# Patient Record
Sex: FEMALE | Race: WHITE | URBAN - METROPOLITAN AREA
[De-identification: names, ages, dates, MRNs, and addresses within clinical notes are randomized per-mention and may not be internally consistent; named-entity substitution may affect disease eponyms.]

---

## 2017-07-25 ENCOUNTER — HOSPITAL ENCOUNTER (OUTPATIENT)
Dept: SURGERY | Age: 57
Discharge: HOME | End: 2017-07-25
Attending: INTERNAL MEDICINE | Admitting: INTERNAL MEDICINE

## 2017-07-25 VITALS
SYSTOLIC BLOOD PRESSURE: 91 MMHG | DIASTOLIC BLOOD PRESSURE: 47 MMHG | WEIGHT: 178 LBS | HEART RATE: 75 BPM | OXYGEN SATURATION: 100 % | HEIGHT: 67 IN | BODY MASS INDEX: 27.94 KG/M2 | RESPIRATION RATE: 18 BRPM | TEMPERATURE: 99 F

## 2017-07-25 LAB — PREGNANCY, URINE: NEGATIVE

## 2017-07-25 RX ORDER — SODIUM CHLORIDE, SODIUM LACTATE, POTASSIUM CHLORIDE, CALCIUM CHLORIDE 600; 310; 30; 20 MG/100ML; MG/100ML; MG/100ML; MG/100ML
INJECTION, SOLUTION INTRAVENOUS CONTINUOUS
Status: DISCONTINUED | OUTPATIENT
Start: 2017-07-25 | End: 2017-07-26 | Stop reason: HOSPADM

## 2017-07-25 RX ORDER — LIDOCAINE HYDROCHLORIDE 10 MG/ML
0.1 INJECTION, SOLUTION EPIDURAL; INFILTRATION; INTRACAUDAL; PERINEURAL ONCE
Status: DISCONTINUED | OUTPATIENT
Start: 2017-07-25 | End: 2017-07-26 | Stop reason: HOSPADM

## 2017-07-25 RX ORDER — GARLIC 180 MG
TABLET, DELAYED RELEASE (ENTERIC COATED) ORAL
COMMUNITY

## 2017-07-25 RX ADMIN — SODIUM CHLORIDE, SODIUM LACTATE, POTASSIUM CHLORIDE, CALCIUM CHLORIDE: 600; 310; 30; 20 INJECTION, SOLUTION INTRAVENOUS at 09:50

## 2017-07-25 ASSESSMENT — PAIN - FUNCTIONAL ASSESSMENT: PAIN_FUNCTIONAL_ASSESSMENT: 0-10

## 2017-07-25 ASSESSMENT — PAIN SCALES - GENERAL: PAINLEVEL_OUTOF10: 0

## 2023-12-12 NOTE — PROGRESS NOTES
[facility]    CC: Breast CA     Referring physician: Johnathan Henley MD    HPI: This is a 66 y.o. female with a PMHx as delineated below who presents in consultation for breast reconstruction. She was found to have left breast cancer and desires to proceed with breast conservation therapy with lumpectomy and possible oncoplastic reconstruction vs mastectomy. She has a history of long standing breast implants as well (2000). Given these features, plastic surgery was consulted for evaluation and treatment. Of note, she had a sleeve gastrectomy in 2014.  The specifics of her breast cancer workup / treatment is as follows:     Diagnosis: DCIS  Mo/Yr Diagnosed: 12/23  Breast Involved:Left  Tumor Size & ndGndrndanddndend:nd nd2nd Stage: 0  Myrtle status: Negative  ER: Positive ND: Positive HER2: Unknown    Oncologist: None  Radiation: WILL NEED IF BCT    Chemo/Meds: None  Pregnancy/Miscarriages: 2 / 0    PMHx:   Past Medical History:   Diagnosis Date    Knapp's esophagus 2002    dx not supported on subsequent exams    Cancer ([address])     basal cell skin    Diverticulosis of colon (without mention of hemorrhage) 2006    DM (diabetes mellitus), gestational 200    Dyslipidemia 2009    Data in [facility] records, patient denies Dx    Hypothyroidism     Migraine     Reflux esophagitis 2004    S/P MADINA (total abdominal hysterectomy)     denies this diagnosis  jluy 2017    Thyroid disease     hypothyroidism     PSHx:   Past Surgical History:   Procedure Laterality Date    ABDOMINAL SURGERY  2014    sleeve     BLADDER SUSPENSION      BREAST SURGERY  2000    augmentation    COLONOSCOPY  07/11/2006    Diverticulosis    COLONOSCOPY  02/19/2013    diverticulosis    CYSTOSCOPY      KIDNEY REMOVAL Right 2015    cancer    SINUS SURGERY      UPPER GASTROINTESTINAL ENDOSCOPY  05/18/2002    Knapp's    UPPER GASTROINTESTINAL ENDOSCOPY  02/19/2013    gastric polyps    UPPER GASTROINTESTINAL ENDOSCOPY  03/29/2011    Gastric polyps

## 2023-12-13 ENCOUNTER — OFFICE VISIT (OUTPATIENT)
Dept: SURGERY | Age: 63
End: 2023-12-13
Payer: COMMERCIAL

## 2023-12-13 VITALS
DIASTOLIC BLOOD PRESSURE: 91 MMHG | WEIGHT: 180 LBS | TEMPERATURE: 97.3 F | HEART RATE: 83 BPM | OXYGEN SATURATION: 100 % | BODY MASS INDEX: 28.19 KG/M2 | SYSTOLIC BLOOD PRESSURE: 136 MMHG | RESPIRATION RATE: 18 BRPM

## 2023-12-13 DIAGNOSIS — C50.912 MALIGNANT NEOPLASM OF LEFT BREAST IN FEMALE, ESTROGEN RECEPTOR POSITIVE, UNSPECIFIED SITE OF BREAST (HCC): Primary | ICD-10-CM

## 2023-12-13 DIAGNOSIS — Z17.0 MALIGNANT NEOPLASM OF LEFT BREAST IN FEMALE, ESTROGEN RECEPTOR POSITIVE, UNSPECIFIED SITE OF BREAST (HCC): Primary | ICD-10-CM

## 2023-12-13 PROCEDURE — 1036F TOBACCO NON-USER: CPT | Performed by: SURGERY

## 2023-12-13 PROCEDURE — G8427 DOCREV CUR MEDS BY ELIG CLIN: HCPCS | Performed by: SURGERY

## 2023-12-13 PROCEDURE — 3017F COLORECTAL CA SCREEN DOC REV: CPT | Performed by: SURGERY

## 2023-12-13 PROCEDURE — G8419 CALC BMI OUT NRM PARAM NOF/U: HCPCS | Performed by: SURGERY

## 2023-12-13 PROCEDURE — 99205 OFFICE O/P NEW HI 60 MIN: CPT | Performed by: SURGERY

## 2023-12-13 PROCEDURE — G8484 FLU IMMUNIZE NO ADMIN: HCPCS | Performed by: SURGERY

## 2023-12-13 RX ORDER — ATORVASTATIN CALCIUM 20 MG/1
20 TABLET, FILM COATED ORAL DAILY
COMMUNITY

## 2023-12-13 RX ORDER — OMEPRAZOLE 20 MG/1
20 CAPSULE, DELAYED RELEASE ORAL DAILY
COMMUNITY
Start: 2023-12-08

## 2023-12-13 RX ORDER — SEMAGLUTIDE 0.68 MG/ML
INJECTION, SOLUTION SUBCUTANEOUS
COMMUNITY
Start: 2023-11-25

## 2023-12-13 RX ORDER — AMLODIPINE BESYLATE AND BENAZEPRIL HYDROCHLORIDE 5; 10 MG/1; MG/1
1 CAPSULE ORAL DAILY
COMMUNITY
Start: 2023-12-02

## 2023-12-14 ENCOUNTER — TELEPHONE (OUTPATIENT)
Dept: SURGERY | Age: 63
End: 2023-12-14

## 2023-12-14 NOTE — TELEPHONE ENCOUNTER
The patient was in the office to see Dr. Saad Campos yesterday. PLAN: Discussed options with Jorge Driscoll. She is leaning toward explantation of her implants as well as concomitant lumpectomy with oncoplastic reconstruction. We discussed the risks involved with replacement of her implants given the need for radiation and she understands and desires to have implants placed. Will work with Dr. Oscar Soto and schedule. I received a surgery letter. According to the [facility] Provider Portal : Notification or Prior Authorization is not required for the requested services    This [employer] plan does not currently require a prior authorization for these services. If you have general questions about the prior authorization requirements, please call us at [phone] or visit [employer] > Clinician Resources > Advance and Admission Notification Requirements. The number above acknowledges your notification. Please write this number down for future reference. Notification is not a guarantee of coverage or payment. Decision ID #:[id number]     I lmom for the patient at the home number listed. I requested a call back to discuss insurance and surgery scheduling. Time HELD 1- 11 am.    I will leave this phone note open.

## 2023-12-15 NOTE — TELEPHONE ENCOUNTER
I spoke with the patient at the home number listed. The patient is now scheduled for surgery with  on 1-. The patient is aware of H&P. The patient is scheduled for her post op appointment 1-. I will submit the surgery letter to [facility] today. I will mail the surgery information and instructions to the patient today. I will close this phone note.

## 2023-12-15 NOTE — TELEPHONE ENCOUNTER
I lmom for the patient at the home number listed. I requested a call back to discuss insurance and surgery scheduling. Time HELD 1- 11 am.     I will leave this phone note open.

## 2023-12-28 ENCOUNTER — TELEPHONE (OUTPATIENT)
Dept: SURGERY | Age: 63
End: 2023-12-28

## 2023-12-28 NOTE — TELEPHONE ENCOUNTER
Patient wanting to know if Dr. Homero Perez will be doing nipple reconstruction during her surgery, or if that is something he even does    Please follow up with patient [phone]

## 2023-12-28 NOTE — TELEPHONE ENCOUNTER
Spoke with patient, she said she spoke with Dr. Kandice Rao about possibly doing L sided mastectomy vs oncoplastic reduction. She states if she goes the mastectomy route Dr. Jose David Yan said it would be non-nipple sparing. She wanted to know if Dr. Jere Gillespie would do nipple recon same day of surgery or if it would be delayed.

## 2023-12-28 NOTE — TELEPHONE ENCOUNTER
[facility]    Would not recommend nipple reconstruction at the same time as the nipple reconstruction is based on the blood supply of the mastectomy skin, which is the most compromised at the time of our mastectomy. Thanks!   NK

## 2023-12-29 NOTE — TELEPHONE ENCOUNTER
Spoke with patient. She verbalized understanding and states she will be making a decision on which surgical route she wants to go and will call us back and give us an update.

## 2024-06-10 ENCOUNTER — HOSPITAL ENCOUNTER (OUTPATIENT)
Dept: WOMENS IMAGING | Age: 64
Discharge: HOME | End: 2024-06-10
Payer: COMMERCIAL

## 2024-06-10 DIAGNOSIS — Z79.811 USE OF AROMATASE INHIBITORS: ICD-10-CM

## 2024-06-10 DIAGNOSIS — C50.112 MALIGNANT NEOPLASM OF CENTRAL PORTION OF LEFT FEMALE BREAST, UNSPECIFIED ESTROGEN RECEPTOR STATUS (HCC): ICD-10-CM

## 2024-06-10 PROCEDURE — 77080 DXA BONE DENSITY AXIAL: CPT

## 2024-11-04 ENCOUNTER — OFFICE VISIT (OUTPATIENT)
Dept: UROGYNECOLOGY | Age: 64
End: 2024-11-04

## 2024-11-04 VITALS
TEMPERATURE: 98.2 F | DIASTOLIC BLOOD PRESSURE: 81 MMHG | SYSTOLIC BLOOD PRESSURE: 113 MMHG | RESPIRATION RATE: 16 BRPM | WEIGHT: 153.8 LBS | OXYGEN SATURATION: 100 % | BODY MASS INDEX: 24.09 KG/M2 | HEART RATE: 64 BPM

## 2024-11-04 DIAGNOSIS — N39.41 URGE INCONTINENCE: ICD-10-CM

## 2024-11-04 DIAGNOSIS — R39.15 URINARY URGENCY: Primary | ICD-10-CM

## 2024-11-04 DIAGNOSIS — Z98.890 HISTORY OF SUBURETHRAL SLING PROCEDURE: ICD-10-CM

## 2024-11-04 LAB
BILIRUBIN, POC: NORMAL
BLOOD URINE, POC: NORMAL
CLARITY, POC: CLEAR
COLOR, POC: YELLOW
EMPTY COUGH STRESS TEST: NORMAL
FIRST SENSATION: 130 CC
FULL COUGH STRESS TEST: NORMAL
GLUCOSE URINE, POC: NORMAL MG/DL
KETONES, POC: NORMAL MG/DL
LEUKOCYTE EST, POC: NORMAL
MAX SENSATION: 250 CC
NITRATE, URINE POC: NORMAL
NITRITE, POC: NORMAL
PH, POC: 6.5
POST VOID RESIDUAL (PVR): 5 ML
PROTEIN, POC: NORMAL MG/DL
RBC URINE, POC: NORMAL
SECOND SENSATION: 190 CC
SPASM: NORMAL
SPECIFIC GRAVITY, POC: 1.01
UROBILINOGEN, POC: NORMAL MG/DL
WBC URINE, POC: NORMAL

## 2024-11-04 PROCEDURE — 99204 OFFICE O/P NEW MOD 45 MIN: CPT | Performed by: NURSE PRACTITIONER

## 2024-11-04 PROCEDURE — 81002 URINALYSIS NONAUTO W/O SCOPE: CPT | Performed by: NURSE PRACTITIONER

## 2024-11-04 PROCEDURE — 51725 SIMPLE CYSTOMETROGRAM: CPT | Performed by: NURSE PRACTITIONER

## 2024-11-04 RX ORDER — ANASTROZOLE 1 MG/1
1 TABLET ORAL DAILY
COMMUNITY

## 2024-11-04 ASSESSMENT — ENCOUNTER SYMPTOMS
VOICE CHANGE: 1
ABDOMINAL PAIN: 1
CONSTIPATION: 1
ABDOMINAL DISTENTION: 1

## 2024-11-04 NOTE — PROGRESS NOTES
11/4/2024      HPI:     Name: Petra Hidalgo  YOB: 1960    CC: Patient is a 64 y.o. female who is here for evaluation of  \"bladder leakage with running water\" . Patient has a history of breast cancer.     HPI:  Previous patient of Dr. Caruso many years ago for [facility]  Now with c/o urinary urgency and leakage specifically with Running water    Drinks 4 bottles water on good days, protein shake, No caffeine or carbonation.    PMH:  Gastric sleeve, breast augmenation, R Nephrectomy d/t kidney CA, Breast Cancer ER+ and lumpectomy 2024.     DATE OF OPERATION: 12/27/2011    PREOPERATIVE DIAGNOSIS: Symptomatic stress urinary incontinence.    POSTOPERATIVE DIAGNOSIS: Symptomatic stress urinary incontinence.    PROCEDURES:  1. Transobturator midurethral sling.  2. Cystourethroscopy.    SURGEON: Leonardo Caruso M.D.    ASSISTANTS: Soha Carl M.D. and Humble Mendoza M.D.    ANESTHESIA: MAC with local.    ESTIMATED BLOOD LOSS: Less than 50 mL.    COMPLICATIONS: None.   Bladder control problem: Yes   How many months have you had a bladder problem? 1 years  Do you use pads to absorb lost urine? No  How many trips do you make to the bathroom during the day? 2  How many times do you wake at night to go to the bathroom? 1  Do you ever wet the bed while asleep? No  Are there times when you cannot make it to the bathroom on time? Yes  Does sound, sight, or feel of running water cause you to lose urine? Yes  How many ounces of liquid do you consume daily? 48 oz  How many drinks containing caffeine do you consume daily?   Which best describes urine loss: (Check all that apply)  [] I lose urine during coughing, sneezing, running, lifting  [] I lose urine with changes in posture, standing, walking  [] I lose urine continuously such that I am constantly wet  [x] I have sudden, urgent needs without the ability to make it to the bathroom  Have you seen a physician for complaints of urine loss? Yes, has seen Dr. Caruso in

## 2024-11-11 ENCOUNTER — TELEPHONE (OUTPATIENT)
Dept: UROGYNECOLOGY | Age: 64
End: 2024-11-11

## 2024-11-12 NOTE — TELEPHONE ENCOUNTER
Called patient- advised her to find a PFPT close to where she is from and give us a call back and we can place the order. Patient verbalized understanding, denies any further questions at this time.

## 2024-11-12 NOTE — TELEPHONE ENCOUNTER
Patient returned call - requesting referral to [facility] on [address] for PFPT. Advised patient that office is now closed, but we will place referral order first thing tomorrow morning. Patient thankful, denies any further needs at this time.

## 2024-11-13 DIAGNOSIS — R39.15 URINARY URGENCY: Primary | ICD-10-CM

## 2024-11-13 DIAGNOSIS — N39.41 URGE INCONTINENCE: ICD-10-CM

## 2024-11-13 NOTE — TELEPHONE ENCOUNTER
Called patient and left voicemail stating referral to [facility] has been placed and faxed successfully at this time per her request. Provided phone number to schedule. Encouraged patient to call back should any questions arise.

## 2024-11-27 ENCOUNTER — OFFICE VISIT (OUTPATIENT)
Age: 64
End: 2024-11-27

## 2024-11-27 VITALS
SYSTOLIC BLOOD PRESSURE: 108 MMHG | OXYGEN SATURATION: 96 % | DIASTOLIC BLOOD PRESSURE: 68 MMHG | BODY MASS INDEX: 24.01 KG/M2 | HEART RATE: 95 BPM | TEMPERATURE: 97.7 F | HEIGHT: 67 IN | WEIGHT: 153 LBS

## 2024-11-27 DIAGNOSIS — R30.0 DYSURIA: ICD-10-CM

## 2024-11-27 DIAGNOSIS — N30.00 ACUTE CYSTITIS WITHOUT HEMATURIA: Primary | ICD-10-CM

## 2024-11-27 DIAGNOSIS — R09.81 NASAL CONGESTION: ICD-10-CM

## 2024-11-27 LAB
BILIRUBIN, POC: ABNORMAL
BLOOD URINE, POC: ABNORMAL
CLARITY, POC: CLEAR
COLOR, POC: YELLOW
GLUCOSE URINE, POC: ABNORMAL MG/DL
KETONES, POC: 80 MG/DL
LEUKOCYTE EST, POC: ABNORMAL
Lab: NORMAL
NITRITE, POC: POSITIVE
PERFORMING INSTRUMENT: NORMAL
PH, POC: 5.5
PROTEIN, POC: 100 MG/DL
QC PASS/FAIL: NORMAL
SARS-COV-2, POC: NORMAL
SPECIFIC GRAVITY, POC: 1.02
UROBILINOGEN, POC: 0.2 MG/DL

## 2024-11-27 RX ORDER — CEPHALEXIN 500 MG/1
500 CAPSULE ORAL 2 TIMES DAILY
Qty: 10 CAPSULE | Refills: 0 | Status: SHIPPED | OUTPATIENT
Start: 2024-11-27 | End: 2024-12-02

## 2024-11-27 NOTE — PATIENT INSTRUCTIONS
Urinalysis was positive for UTI.   Take antibiotic as prescribed.     Upper Respiratory Symptoms:  COVID was negative.     You may take over-the-counter antihistamine such as allegra, zyrtec, claritin.     Alternate Tylenol and Motrin every 4 hours as directed as needed for chills, fever.     Follow-up with your primary care physician or return if not improved.     Go to the ER if you develop significant shortness of breath, difficulty breathing, high fever, chest pain or other major concerning symptoms.

## 2024-11-27 NOTE — PROGRESS NOTES
Petra Hidalgo (:  1960) is a 64 y.o. female,  here for evaluation of the following chief complaint(s): Urinary Tract Infection (Patient feels like she has \"flu like symptoms\")      Petra Hidalgo is a New patient.   Last Urgent Care Visit: Visit date not found  I have reviewed the patient's medications and basic medical history; see Medication Reconciliation.    ASSESSMENT/PLAN:  Diagnosis:     ICD-10-CM    1. Acute cystitis without hematuria  N30.00 Culture, Urine     cephALEXin (KEFLEX) 500 MG capsule      2. Nasal congestion  R09.81 POCT COVID-19, Antigen      3. Dysuria  R30.0 POCT Urinalysis no Micro             Medical Decision Making:    Patient was seen at Urgent Care today for dysuria as well with some URI symptoms including congestion.   On presentation she appears well. NAD. Physical exam is largely unremarkable.   Lungs clear. Pt is in no respiratory distress.   She is afebrile and has no CVA tenderness.     COVID was NEGATIVE;  UA did show small leuks and + nitrites indicating UTI.     Prescriptions provided: Keflex    Patient was discharged home with follow-up and return precautions.     Patient did not have elevated blood pressure greater than 130/80. Therefore, referral to PCP for HTN is not indicated      Results:  Results for orders placed or performed in visit on 24   POCT Urinalysis no Micro   Result Value Ref Range    Color, UA Yellow     Clarity, UA Clear     Glucose, UA POC Neg mg/dL    Bilirubin, UA Neg     Ketones, UA 80 mg/dL    Spec Grav, UA 1.020     Blood, UA POC Small     pH, UA 5.5     Protein, UA  mg/dL    Urobilinogen, UA 0.2 mg/dL    Leukocytes, UA Small     Nitrite, UA Positive    POCT COVID-19, Antigen   Result Value Ref Range    SARS-COV-2, POC Not-Detected Not Detected    Lot Number [id number]     QC Pass/Fail pass     Performing Instrument Bin[employer]NOW           SUBJECTIVE/OBJECTIVE:  HPI:   This is a 64 y.o. female that presents today with complaint of: dysuria

## 2024-11-30 LAB
BACTERIA UR CULT: ABNORMAL
ORGANISM: ABNORMAL